# Patient Record
Sex: MALE | Race: WHITE | NOT HISPANIC OR LATINO
[De-identification: names, ages, dates, MRNs, and addresses within clinical notes are randomized per-mention and may not be internally consistent; named-entity substitution may affect disease eponyms.]

---

## 2019-10-22 ENCOUNTER — TRANSCRIPTION ENCOUNTER (OUTPATIENT)
Age: 34
End: 2019-10-22

## 2019-10-22 ENCOUNTER — INPATIENT (INPATIENT)
Facility: HOSPITAL | Age: 34
LOS: 0 days | Discharge: HOME | End: 2019-10-23
Attending: INTERNAL MEDICINE | Admitting: INTERNAL MEDICINE
Payer: COMMERCIAL

## 2019-10-22 VITALS
DIASTOLIC BLOOD PRESSURE: 82 MMHG | SYSTOLIC BLOOD PRESSURE: 119 MMHG | RESPIRATION RATE: 17 BRPM | HEART RATE: 92 BPM | OXYGEN SATURATION: 100 % | TEMPERATURE: 98 F

## 2019-10-22 DIAGNOSIS — M79.18 MYALGIA, OTHER SITE: ICD-10-CM

## 2019-10-22 DIAGNOSIS — Z98.890 OTHER SPECIFIED POSTPROCEDURAL STATES: Chronic | ICD-10-CM

## 2019-10-22 DIAGNOSIS — D72.829 ELEVATED WHITE BLOOD CELL COUNT, UNSPECIFIED: ICD-10-CM

## 2019-10-22 DIAGNOSIS — J45.909 UNSPECIFIED ASTHMA, UNCOMPLICATED: ICD-10-CM

## 2019-10-22 LAB
ALBUMIN SERPL ELPH-MCNC: 4.8 G/DL — SIGNIFICANT CHANGE UP (ref 3.3–5)
ALP SERPL-CCNC: 54 U/L — SIGNIFICANT CHANGE UP (ref 40–120)
ALT FLD-CCNC: 31 U/L — SIGNIFICANT CHANGE UP (ref 4–41)
ANION GAP SERPL CALC-SCNC: 12 MMO/L — SIGNIFICANT CHANGE UP (ref 7–14)
ANISOCYTOSIS BLD QL: SLIGHT — SIGNIFICANT CHANGE UP
APPEARANCE UR: CLEAR — SIGNIFICANT CHANGE UP
APTT BLD: 32 SEC — SIGNIFICANT CHANGE UP (ref 27.5–36.3)
AST SERPL-CCNC: 35 U/L — SIGNIFICANT CHANGE UP (ref 4–40)
BASOPHILS # BLD AUTO: 0.19 K/UL — SIGNIFICANT CHANGE UP (ref 0–0.2)
BASOPHILS # BLD AUTO: 0.22 K/UL — HIGH (ref 0–0.2)
BASOPHILS NFR BLD AUTO: 0.6 % — SIGNIFICANT CHANGE UP (ref 0–2)
BASOPHILS NFR BLD AUTO: 0.8 % — SIGNIFICANT CHANGE UP (ref 0–2)
BASOPHILS NFR SPEC: 2.5 % — HIGH (ref 0–2)
BILIRUB SERPL-MCNC: 0.3 MG/DL — SIGNIFICANT CHANGE UP (ref 0.2–1.2)
BILIRUB UR-MCNC: NEGATIVE — SIGNIFICANT CHANGE UP
BLASTS # FLD: 0 % — SIGNIFICANT CHANGE UP (ref 0–0)
BLD GP AB SCN SERPL QL: NEGATIVE — SIGNIFICANT CHANGE UP
BLOOD UR QL VISUAL: NEGATIVE — SIGNIFICANT CHANGE UP
BUN SERPL-MCNC: 11 MG/DL — SIGNIFICANT CHANGE UP (ref 7–23)
CALCIUM SERPL-MCNC: 9.7 MG/DL — SIGNIFICANT CHANGE UP (ref 8.4–10.5)
CHLORIDE SERPL-SCNC: 101 MMOL/L — SIGNIFICANT CHANGE UP (ref 98–107)
CO2 SERPL-SCNC: 24 MMOL/L — SIGNIFICANT CHANGE UP (ref 22–31)
COLOR SPEC: SIGNIFICANT CHANGE UP
CREAT SERPL-MCNC: 0.9 MG/DL — SIGNIFICANT CHANGE UP (ref 0.5–1.3)
EOSINOPHIL # BLD AUTO: 0.36 K/UL — SIGNIFICANT CHANGE UP (ref 0–0.5)
EOSINOPHIL # BLD AUTO: 0.41 K/UL — SIGNIFICANT CHANGE UP (ref 0–0.5)
EOSINOPHIL NFR BLD AUTO: 1.4 % — SIGNIFICANT CHANGE UP (ref 0–6)
EOSINOPHIL NFR BLD AUTO: 1.4 % — SIGNIFICANT CHANGE UP (ref 0–6)
EOSINOPHIL NFR FLD: 3.3 % — SIGNIFICANT CHANGE UP (ref 0–6)
GLUCOSE SERPL-MCNC: 109 MG/DL — HIGH (ref 70–99)
GLUCOSE UR-MCNC: NEGATIVE — SIGNIFICANT CHANGE UP
HCT VFR BLD CALC: 46.4 % — SIGNIFICANT CHANGE UP (ref 39–50)
HCT VFR BLD CALC: 47.5 % — SIGNIFICANT CHANGE UP (ref 39–50)
HGB BLD-MCNC: 14.8 G/DL — SIGNIFICANT CHANGE UP (ref 13–17)
HGB BLD-MCNC: 14.9 G/DL — SIGNIFICANT CHANGE UP (ref 13–17)
IMM GRANULOCYTES NFR BLD AUTO: 23.5 % — HIGH (ref 0–1.5)
IMM GRANULOCYTES NFR BLD AUTO: 24.7 % — HIGH (ref 0–1.5)
INR BLD: 1.04 — SIGNIFICANT CHANGE UP (ref 0.88–1.17)
KETONES UR-MCNC: NEGATIVE — SIGNIFICANT CHANGE UP
LEUKOCYTE ESTERASE UR-ACNC: NEGATIVE — SIGNIFICANT CHANGE UP
LYMPHOCYTES # BLD AUTO: 11.3 % — LOW (ref 13–44)
LYMPHOCYTES # BLD AUTO: 13.4 % — SIGNIFICANT CHANGE UP (ref 13–44)
LYMPHOCYTES # BLD AUTO: 3 K/UL — SIGNIFICANT CHANGE UP (ref 1–3.3)
LYMPHOCYTES # BLD AUTO: 4.05 K/UL — HIGH (ref 1–3.3)
LYMPHOCYTES NFR SPEC AUTO: 11.8 % — LOW (ref 13–44)
MANUAL SMEAR VERIFICATION: SIGNIFICANT CHANGE UP
MCHC RBC-ENTMCNC: 26.1 PG — LOW (ref 27–34)
MCHC RBC-ENTMCNC: 26.4 PG — LOW (ref 27–34)
MCHC RBC-ENTMCNC: 31.4 % — LOW (ref 32–36)
MCHC RBC-ENTMCNC: 31.9 % — LOW (ref 32–36)
MCV RBC AUTO: 82.7 FL — SIGNIFICANT CHANGE UP (ref 80–100)
MCV RBC AUTO: 83.2 FL — SIGNIFICANT CHANGE UP (ref 80–100)
METAMYELOCYTES # FLD: 8.4 % — HIGH (ref 0–1)
MONOCYTES # BLD AUTO: 1.06 K/UL — HIGH (ref 0–0.9)
MONOCYTES # BLD AUTO: 1.17 K/UL — HIGH (ref 0–0.9)
MONOCYTES NFR BLD AUTO: 3.9 % — SIGNIFICANT CHANGE UP (ref 2–14)
MONOCYTES NFR BLD AUTO: 4 % — SIGNIFICANT CHANGE UP (ref 2–14)
MONOCYTES NFR BLD: 5 % — SIGNIFICANT CHANGE UP (ref 2–9)
MYELOCYTES NFR BLD: 3.4 % — HIGH (ref 0–0)
NEUTROPHIL AB SER-ACNC: 58.8 % — SIGNIFICANT CHANGE UP (ref 43–77)
NEUTROPHILS # BLD AUTO: 15.73 K/UL — HIGH (ref 1.8–7.4)
NEUTROPHILS # BLD AUTO: 17.01 K/UL — HIGH (ref 1.8–7.4)
NEUTROPHILS NFR BLD AUTO: 56 % — SIGNIFICANT CHANGE UP (ref 43–77)
NEUTROPHILS NFR BLD AUTO: 59 % — SIGNIFICANT CHANGE UP (ref 43–77)
NEUTS BAND # BLD: 3.4 % — SIGNIFICANT CHANGE UP (ref 0–6)
NITRITE UR-MCNC: NEGATIVE — SIGNIFICANT CHANGE UP
NRBC # FLD: 0 K/UL — SIGNIFICANT CHANGE UP (ref 0–0)
NRBC # FLD: 0 K/UL — SIGNIFICANT CHANGE UP (ref 0–0)
OTHER - HEMATOLOGY %: 0 — SIGNIFICANT CHANGE UP
PH UR: 7.5 — SIGNIFICANT CHANGE UP (ref 5–8)
PLATELET # BLD AUTO: 259 K/UL — SIGNIFICANT CHANGE UP (ref 150–400)
PLATELET # BLD AUTO: 270 K/UL — SIGNIFICANT CHANGE UP (ref 150–400)
PLATELET COUNT - ESTIMATE: NORMAL — SIGNIFICANT CHANGE UP
PMV BLD: 10.1 FL — SIGNIFICANT CHANGE UP (ref 7–13)
PMV BLD: 10.5 FL — SIGNIFICANT CHANGE UP (ref 7–13)
POTASSIUM SERPL-MCNC: 4.2 MMOL/L — SIGNIFICANT CHANGE UP (ref 3.5–5.3)
POTASSIUM SERPL-SCNC: 4.2 MMOL/L — SIGNIFICANT CHANGE UP (ref 3.5–5.3)
PROMYELOCYTES # FLD: 0 % — SIGNIFICANT CHANGE UP (ref 0–0)
PROT SERPL-MCNC: 7.9 G/DL — SIGNIFICANT CHANGE UP (ref 6–8.3)
PROT UR-MCNC: NEGATIVE — SIGNIFICANT CHANGE UP
PROTHROM AB SERPL-ACNC: 11.9 SEC — SIGNIFICANT CHANGE UP (ref 9.8–13.1)
RBC # BLD: 5.61 M/UL — SIGNIFICANT CHANGE UP (ref 4.2–5.8)
RBC # BLD: 5.71 M/UL — SIGNIFICANT CHANGE UP (ref 4.2–5.8)
RBC # FLD: 15 % — HIGH (ref 10.3–14.5)
RBC # FLD: 15.1 % — HIGH (ref 10.3–14.5)
REVIEW TO FOLLOW: YES — SIGNIFICANT CHANGE UP
RH IG SCN BLD-IMP: POSITIVE — SIGNIFICANT CHANGE UP
SODIUM SERPL-SCNC: 137 MMOL/L — SIGNIFICANT CHANGE UP (ref 135–145)
SP GR SPEC: 1.01 — SIGNIFICANT CHANGE UP (ref 1–1.04)
UROBILINOGEN FLD QL: NORMAL — SIGNIFICANT CHANGE UP
VARIANT LYMPHS # BLD: 3.4 % — SIGNIFICANT CHANGE UP
WBC # BLD: 26.64 K/UL — HIGH (ref 3.8–10.5)
WBC # BLD: 30.3 K/UL — HIGH (ref 3.8–10.5)
WBC # FLD AUTO: 26.64 K/UL — HIGH (ref 3.8–10.5)
WBC # FLD AUTO: 30.3 K/UL — HIGH (ref 3.8–10.5)

## 2019-10-22 PROCEDURE — 74177 CT ABD & PELVIS W/CONTRAST: CPT | Mod: 26

## 2019-10-22 PROCEDURE — 76870 US EXAM SCROTUM: CPT | Mod: 26

## 2019-10-22 RX ORDER — KETOROLAC TROMETHAMINE 30 MG/ML
15 SYRINGE (ML) INJECTION ONCE
Refills: 0 | Status: DISCONTINUED | OUTPATIENT
Start: 2019-10-22 | End: 2019-10-22

## 2019-10-22 RX ORDER — ONDANSETRON 8 MG/1
4 TABLET, FILM COATED ORAL ONCE
Refills: 0 | Status: COMPLETED | OUTPATIENT
Start: 2019-10-22 | End: 2019-10-22

## 2019-10-22 RX ORDER — KETOROLAC TROMETHAMINE 30 MG/ML
30 SYRINGE (ML) INJECTION EVERY 8 HOURS
Refills: 0 | Status: DISCONTINUED | OUTPATIENT
Start: 2019-10-22 | End: 2019-10-23

## 2019-10-22 RX ORDER — MORPHINE SULFATE 50 MG/1
2 CAPSULE, EXTENDED RELEASE ORAL EVERY 4 HOURS
Refills: 0 | Status: DISCONTINUED | OUTPATIENT
Start: 2019-10-22 | End: 2019-10-23

## 2019-10-22 RX ORDER — MORPHINE SULFATE 50 MG/1
4 CAPSULE, EXTENDED RELEASE ORAL ONCE
Refills: 0 | Status: DISCONTINUED | OUTPATIENT
Start: 2019-10-22 | End: 2019-10-22

## 2019-10-22 RX ORDER — SODIUM CHLORIDE 9 MG/ML
1000 INJECTION INTRAMUSCULAR; INTRAVENOUS; SUBCUTANEOUS ONCE
Refills: 0 | Status: COMPLETED | OUTPATIENT
Start: 2019-10-22 | End: 2019-10-22

## 2019-10-22 RX ORDER — ACETAMINOPHEN 500 MG
650 TABLET ORAL ONCE
Refills: 0 | Status: COMPLETED | OUTPATIENT
Start: 2019-10-22 | End: 2019-10-22

## 2019-10-22 RX ADMIN — MORPHINE SULFATE 4 MILLIGRAM(S): 50 CAPSULE, EXTENDED RELEASE ORAL at 14:00

## 2019-10-22 RX ADMIN — MORPHINE SULFATE 2 MILLIGRAM(S): 50 CAPSULE, EXTENDED RELEASE ORAL at 23:52

## 2019-10-22 RX ADMIN — MORPHINE SULFATE 4 MILLIGRAM(S): 50 CAPSULE, EXTENDED RELEASE ORAL at 11:03

## 2019-10-22 RX ADMIN — Medication 15 MILLIGRAM(S): at 16:15

## 2019-10-22 RX ADMIN — SODIUM CHLORIDE 1000 MILLILITER(S): 9 INJECTION INTRAMUSCULAR; INTRAVENOUS; SUBCUTANEOUS at 11:03

## 2019-10-22 RX ADMIN — MORPHINE SULFATE 2 MILLIGRAM(S): 50 CAPSULE, EXTENDED RELEASE ORAL at 23:36

## 2019-10-22 RX ADMIN — Medication 650 MILLIGRAM(S): at 20:20

## 2019-10-22 RX ADMIN — Medication 15 MILLIGRAM(S): at 19:45

## 2019-10-22 RX ADMIN — ONDANSETRON 4 MILLIGRAM(S): 8 TABLET, FILM COATED ORAL at 11:03

## 2019-10-22 RX ADMIN — Medication 15 MILLIGRAM(S): at 15:50

## 2019-10-22 RX ADMIN — MORPHINE SULFATE 4 MILLIGRAM(S): 50 CAPSULE, EXTENDED RELEASE ORAL at 11:33

## 2019-10-22 RX ADMIN — MORPHINE SULFATE 4 MILLIGRAM(S): 50 CAPSULE, EXTENDED RELEASE ORAL at 13:25

## 2019-10-22 NOTE — ED ADULT NURSE NOTE - OBJECTIVE STATEMENT
Intake RN: Patient is a 34 y/o M a&ox4 p/w a c/c of testicular pain that began several days ago.  Denies dysuria/hematuria, N/V/D, F/C.  Patient reports pain is localized to right testicle.  20 gauge PIV in right ac, medicated as per MD order.

## 2019-10-22 NOTE — H&P ADULT - NSHPREVIEWOFSYSTEMS_GEN_ALL_CORE
Gen: no loss of wt no loss of appetite  ENT: no dizziness no hearing loss  Ophth: no blurring of vision no loss of vision  Resp: No cough no sputum production  CVS: No chest pain no palpitations no orthopnea  GI: + nausea, no vomiting or diarrhea   : no dysuria, hematuria  Endo: no polyuria no excessive sweating  Neuro: no weakness no paresthesias  Psych: No suicidal no depressive ideation  Heme: No petechiae no easy bruising  Msk: No joint pain no swelling see above HPI   Skin: No rash no itching  All other ROS negative

## 2019-10-22 NOTE — ED PROVIDER NOTE - OBJECTIVE STATEMENT
Cabot: 33M with PMH of asthma, PSH of umbilical hernia, here with R groin pain since picking up an air conditioner yesterday.  + nausea.  No F/C/V, + flatus and BMs, no bulge at groin.  No urinary sx.

## 2019-10-22 NOTE — H&P ADULT - ASSESSMENT
33M with PMH of asthma, PSH of umbilical hernia, here with R groin pain since picking up an air conditioner yesterday. Noted to have leukocytosis admitted for pain control and work up of leukocytosis

## 2019-10-22 NOTE — H&P ADULT - PROBLEM SELECTOR PLAN 1
unclear etiology   CT abd is negative   no lymphadenopathy noted  DDx includes leukemia, lymphoma, other hematopoietic disorders  heme evaluation in AM

## 2019-10-22 NOTE — CHART NOTE - NSCHARTNOTEFT_GEN_A_CORE
Pt w/right sided groin pain, w/associated testicular discomfort since yesterday afternoon. Pain started after lifting heavy object. Since then, pt has had persistent pain, somewhat alleviated w/ibuprofen. Also alleviated when lying down. Denies dysuria.    PE  Gen: NAD  Abd: RLQ tenderness, discomfort upon palpation along inguinal canal  : circumcised phallus, testicles normal lie b/l, no edema, no overlying skin changes. R epididymal cyst palpated. R testicle mildly tender to palpation  no inguinal hernia palpated on valsalva    < from: US Testicles (10.22.19 @ 11:34) >    RIGHT:    Right testis: 5 x 3 x 4 cm. Normal echogenicity and echotexture with no   masses or areas of architectural distortion. Normal arterial and venous   blood flow pattern.    Right epididymis: Tiny cyst.    Right hydrocele: Trace.    Right varicocele: None.    Urinalysis (10.22.19 @ 11:00)    Color: LIGHT YELLOW    Urine Appearance: CLEAR    Glucose: NEGATIVE    Bilirubin: NEGATIVE    Ketone - Urine: NEGATIVE    Specific Gravity: 1.009    Blood: NEGATIVE    pH - Urine: 7.5    Protein, Urine: NEGATIVE    Urobilinogen: NORMAL    Nitrite: NEGATIVE    Leukocyte Esterase Concentration: NEGATIVE    LEFT:     Left testis: 4 x 2 x 3 cm. Normal echogenicity and echotexture with no   masses or areas of architectural distortion. Normal arterial and venous   blood flow pattern.    Left epididymis: Within normal limits.    Left hydrocele: Small.    Left varicocele: None.    IMPRESSION:     No evidence of testicular torsion.    < end of copied text >    33M w/right sided groin pain, w/associated testicular discomfort since yesterday afternoon.  -- US not consisted w/testicular torsion - nl arterial flow to R testicle  -- R testicle tender, however pain started after heavy lifting - muscle strain vs R epididymoorchitis   -- NSAIDs PRN, scrotal support and elevation  -- please call [phone] if further questions

## 2019-10-22 NOTE — H&P ADULT - NSHPPHYSICALEXAM_GEN_ALL_CORE
PHYSICAL EXAM: vital signs as above  in no apparent distress  HEENT: SUSANNA EOMI  Neck: Supple, no JVD, no thyromegaly  Lungs: no rhonchi, no wheeze, no crackles  CVS: S1 S2 no M/R/G  Abdomen: no tenderness, no organomegaly, BS present  + tenderness superficial over right lower abd   Neuro: AO x 3 no focal weakness, no sensory abnormalities  Psych: appropriate affect  Skin: warm, dry  Ext: no cyanosis or clubbing, no edema  Msk: no joint swelling or deformities  Back: no CVA tenderness, no kyphosis/scoliosis

## 2019-10-22 NOTE — ED PROVIDER NOTE - PROGRESS NOTE DETAILS
Cabot: Concern for testicular torsion.  Urology aware and coming down to eval after testicular US, which patient will be wheeled up to now. Cabot: No torsion on US.  Will check CT A/P for hernia, stone, other pathology. Lea: sign out received from Dr. Cabot. awaiting Uro recs. Uro coming to see. elevated WBC count noted. spoke with pts PMD, Dr. Hayes who reports elevation in the past of 19K, referred to a Hematologist but didn't follow up. Spoke with patient regarding elevation and possible causes including hematologic malignancy, recommending admission for heme/onc eval. patient comfortable with plan.

## 2019-10-22 NOTE — ED PROVIDER NOTE - CLINICAL SUMMARY MEDICAL DECISION MAKING FREE TEXT BOX
Cabot: 33M with PMH of asthma, PSH of umbilical hernia, here with R groin pain since picking up an air conditioner yesterday.  + nausea.  No F/C/V, + flatus and BMs, no bulge at groin.  No urinary sx.  High riding testicle, tender to palpation, no cremasteric reflex, no hernia noted.  Concern for testicular torsion, less likely hernia or stone.  Will check preop labs, UA, testic US.  Uro already aware and coming to eval.

## 2019-10-22 NOTE — H&P ADULT - HISTORY OF PRESENT ILLNESS
33M with PMH of asthma, PSH of umbilical hernia, here with R groin pain since picking up an air conditioner yesterday + nausea. He needed multiple doses of pain meds in the Emergency Department and is still in considerable pain. States pain is radiating onto right groin and it hurts him to move torso right to left rotational movement

## 2019-10-22 NOTE — ED PROVIDER NOTE - PHYSICAL EXAMINATION
HDS, NAD, MMM, eyes clear, lungs CTAB, heart sounds normal, abd soft, NT, ND, no CVAT, no R groin hernia appreciated, R testicle riding 2cm higher than the left, missing R sided cremasteric reflex, R testicle tender to palpation, LEs without edema, wwp, skin normal temperature and color, neuro: alert and oriented, no focal deficits

## 2019-10-23 ENCOUNTER — TRANSCRIPTION ENCOUNTER (OUTPATIENT)
Age: 34
End: 2019-10-23

## 2019-10-23 VITALS — HEIGHT: 72 IN | WEIGHT: 202.83 LBS

## 2019-10-23 LAB
ANION GAP SERPL CALC-SCNC: 14 MMO/L — SIGNIFICANT CHANGE UP (ref 7–14)
BASOPHILS # BLD AUTO: 0.13 K/UL — SIGNIFICANT CHANGE UP (ref 0–0.2)
BASOPHILS NFR BLD AUTO: 0.6 % — SIGNIFICANT CHANGE UP (ref 0–2)
BUN SERPL-MCNC: 17 MG/DL — SIGNIFICANT CHANGE UP (ref 7–23)
CALCIUM SERPL-MCNC: 8.8 MG/DL — SIGNIFICANT CHANGE UP (ref 8.4–10.5)
CHLORIDE SERPL-SCNC: 101 MMOL/L — SIGNIFICANT CHANGE UP (ref 98–107)
CO2 SERPL-SCNC: 22 MMOL/L — SIGNIFICANT CHANGE UP (ref 22–31)
CREAT SERPL-MCNC: 0.99 MG/DL — SIGNIFICANT CHANGE UP (ref 0.5–1.3)
EOSINOPHIL # BLD AUTO: 0.38 K/UL — SIGNIFICANT CHANGE UP (ref 0–0.5)
EOSINOPHIL NFR BLD AUTO: 1.7 % — SIGNIFICANT CHANGE UP (ref 0–6)
GLUCOSE SERPL-MCNC: 78 MG/DL — SIGNIFICANT CHANGE UP (ref 70–99)
HCT VFR BLD CALC: 45.7 % — SIGNIFICANT CHANGE UP (ref 39–50)
HGB BLD-MCNC: 13.7 G/DL — SIGNIFICANT CHANGE UP (ref 13–17)
IMM GRANULOCYTES NFR BLD AUTO: 21.7 % — HIGH (ref 0–1.5)
INR BLD: 1.06 — SIGNIFICANT CHANGE UP (ref 0.88–1.17)
LYMPHOCYTES # BLD AUTO: 15.7 % — SIGNIFICANT CHANGE UP (ref 13–44)
LYMPHOCYTES # BLD AUTO: 3.54 K/UL — HIGH (ref 1–3.3)
MANUAL SMEAR VERIFICATION: SIGNIFICANT CHANGE UP
MCHC RBC-ENTMCNC: 25.8 PG — LOW (ref 27–34)
MCHC RBC-ENTMCNC: 30 % — LOW (ref 32–36)
MCV RBC AUTO: 85.9 FL — SIGNIFICANT CHANGE UP (ref 80–100)
MONOCYTES # BLD AUTO: 0.88 K/UL — SIGNIFICANT CHANGE UP (ref 0–0.9)
MONOCYTES NFR BLD AUTO: 3.9 % — SIGNIFICANT CHANGE UP (ref 2–14)
NEUTROPHILS # BLD AUTO: 12.75 K/UL — HIGH (ref 1.8–7.4)
NEUTROPHILS NFR BLD AUTO: 56.4 % — SIGNIFICANT CHANGE UP (ref 43–77)
NRBC # FLD: 0 K/UL — SIGNIFICANT CHANGE UP (ref 0–0)
PLATELET # BLD AUTO: 241 K/UL — SIGNIFICANT CHANGE UP (ref 150–400)
PMV BLD: 11 FL — SIGNIFICANT CHANGE UP (ref 7–13)
POTASSIUM SERPL-MCNC: 4.1 MMOL/L — SIGNIFICANT CHANGE UP (ref 3.5–5.3)
POTASSIUM SERPL-SCNC: 4.1 MMOL/L — SIGNIFICANT CHANGE UP (ref 3.5–5.3)
PROTHROM AB SERPL-ACNC: 11.8 SEC — SIGNIFICANT CHANGE UP (ref 9.8–13.1)
RBC # BLD: 5.32 M/UL — SIGNIFICANT CHANGE UP (ref 4.2–5.8)
RBC # FLD: 15.1 % — HIGH (ref 10.3–14.5)
SODIUM SERPL-SCNC: 137 MMOL/L — SIGNIFICANT CHANGE UP (ref 135–145)
SPECIMEN SOURCE: SIGNIFICANT CHANGE UP
TSH SERPL-MCNC: 2.48 UIU/ML — SIGNIFICANT CHANGE UP (ref 0.27–4.2)
WBC # BLD: 22.57 K/UL — HIGH (ref 3.8–10.5)
WBC # FLD AUTO: 22.57 K/UL — HIGH (ref 3.8–10.5)

## 2019-10-23 RX ORDER — ACETAMINOPHEN 500 MG
650 TABLET ORAL ONCE
Refills: 0 | Status: COMPLETED | OUTPATIENT
Start: 2019-10-23 | End: 2019-10-23

## 2019-10-23 RX ORDER — PANTOPRAZOLE SODIUM 20 MG/1
1 TABLET, DELAYED RELEASE ORAL
Qty: 7 | Refills: 0
Start: 2019-10-23 | End: 2019-10-29

## 2019-10-23 RX ORDER — DOCUSATE SODIUM 100 MG
1 CAPSULE ORAL
Qty: 14 | Refills: 0
Start: 2019-10-23 | End: 2019-10-29

## 2019-10-23 RX ORDER — ONDANSETRON 8 MG/1
4 TABLET, FILM COATED ORAL ONCE
Refills: 0 | Status: COMPLETED | OUTPATIENT
Start: 2019-10-23 | End: 2019-10-23

## 2019-10-23 RX ADMIN — MORPHINE SULFATE 2 MILLIGRAM(S): 50 CAPSULE, EXTENDED RELEASE ORAL at 08:50

## 2019-10-23 RX ADMIN — Medication 30 MILLIGRAM(S): at 05:30

## 2019-10-23 RX ADMIN — ONDANSETRON 4 MILLIGRAM(S): 8 TABLET, FILM COATED ORAL at 05:31

## 2019-10-23 RX ADMIN — Medication 30 MILLIGRAM(S): at 05:14

## 2019-10-23 RX ADMIN — MORPHINE SULFATE 2 MILLIGRAM(S): 50 CAPSULE, EXTENDED RELEASE ORAL at 08:39

## 2019-10-23 RX ADMIN — Medication 30 MILLIGRAM(S): at 14:00

## 2019-10-23 RX ADMIN — Medication 650 MILLIGRAM(S): at 12:01

## 2019-10-23 RX ADMIN — Medication 650 MILLIGRAM(S): at 12:45

## 2019-10-23 RX ADMIN — Medication 30 MILLIGRAM(S): at 13:52

## 2019-10-23 NOTE — DISCHARGE NOTE PROVIDER - HOSPITAL COURSE
33M with PMH of asthma, PSH of umbilical hernia, here with R groin pain since picking up an air conditioner yesterday. Noted to have leukocytosis admitted for pain control and work up of leukocytosis          Leukocytosis unclear etiology     - CT A/P- No acute pathology, no lymphadenopathy noted    - Amirah Rivera consulted- chronic leukocytosis with nl diff in asthmatic who is on chronic inhaled steroids twice daily    - obtained PCR for BCR/ABL r/o CML        Musculoskeletal pain - likely strain injury secondary to heavy lifting    - continue pain meds, Naproxen 500 po BID x 5 days, Protonix 40 po qd x 7 days, Percocet PRN  5/325 2 tabs q 4 PRN, Colace 100 BID PRN        Asthma- no wheeze, continue with Proventil inhaler PRN        Dispo home with outpt follow up with PCP and Amirah

## 2019-10-23 NOTE — CONSULT NOTE ADULT - ASSESSMENT
chronic leukocytosis with nl diff in asthmatic on chronic inhaled steroids twice daily  likely from demargination  obtain PCR for bcr/abl r/o cml, doubt  Pt instructed to call me for result in one week as likely to be discharged.

## 2019-10-23 NOTE — DISCHARGE NOTE NURSING/CASE MANAGEMENT/SOCIAL WORK - PATIENT PORTAL LINK FT
You can access the FollowMyHealth Patient Portal offered by [facility] by registering at the following website: [email or web address]. By joining [facility]’s FollowMyHealth portal, you will also be able to view your health information using other applications (apps) compatible with our system.

## 2019-10-23 NOTE — DISCHARGE NOTE PROVIDER - CARE PROVIDER_API CALL
Wm Rivera (MD)  Hematology; Medical Oncology  [address], [address]  [place] [address]  Phone: [phone]  Fax: [phone]  Follow Up Time:

## 2019-10-23 NOTE — PROGRESS NOTE ADULT - SUBJECTIVE AND OBJECTIVE BOX
Patient is a 33y old  Male who presents with a chief complaint of right abdominal and groin pain (23 Oct 2019 09:37)      SUBJECTIVE / OVERNIGHT EVENTS: overnight events noted    ROS:  Resp: No cough no sputum production  CVS: No chest pain no palpitations no orthopnea  GI: no N/V/D  right groin and abd pain better  : no dysuria, no hematuria  Neuro: no weakness no paresthesias  Heme: No petechiae no easy bruising  Msk: No joint pain no swelling  Skin: No rash no itching        MEDICATIONS  (STANDING):  ketorolac   Injectable 30 milliGRAM(s) IV Push every 8 hours    MEDICATIONS  (PRN):  morphine  - Injectable 2 milliGRAM(s) IV Push every 4 hours PRN Moderate Pain (4 - 6)        CAPILLARY BLOOD GLUCOSE        I&O's Summary      Vital Signs Last 24 Hrs  T(C): 36.6 (23 Oct 2019 05:13), Max: 36.7 (22 Oct 2019 13:49)  T(F): 97.8 (23 Oct 2019 05:13), Max: 98.1 (22 Oct 2019 13:49)  HR: 63 (23 Oct 2019 08:40) (50 - 63)  BP: 111/65 (23 Oct 2019 08:40) (104/64 - 121/79)  BP(mean): --  RR: 16 (23 Oct 2019 08:40) (15 - 18)  SpO2: 100% (23 Oct 2019 08:40) (97% - 100%)    PHYSICAL EXAM: vital signs as above  in no apparent distress  HEENT: SUSANNA EOMI  Neck: Supple, no JVD, no thyromegaly  Lungs: no rhonchi, no wheeze, no crackles  CVS: S1 S2 no M/R/G  Abdomen: no tenderness, no organomegaly, BS present  less tenderness superficial over right lower abd   Neuro: AO x 3 no focal weakness, no sensory abnormalities  Psych: appropriate affect  Skin: warm, dry  Ext: no cyanosis or clubbing, no edema  Msk: no joint swelling or deformities  Back: no CVA tenderness, no kyphosis/scoliosis    LABS:                        13.7   22.57 )-----------( 241      ( 23 Oct 2019 05:10 )             45.7     10    137  |  101  |  17  ----------------------------<  78  4.1   |  22  |  0.99    Ca    8.8      23 Oct 2019 05:10    TPro  7.9  /  Alb  4.8  /  TBili  0.3  /  DBili  x   /  AST  35  /  ALT  31  /  AlkPhos  54  10-22    PT/INR - ( 23 Oct 2019 05:10 )   PT: 11.8 SEC;   INR: 1.06          PTT - ( 22 Oct 2019 11:00 )  PTT:32.0 SEC      Urinalysis Basic - ( 22 Oct 2019 11:00 )    Color: LIGHT YELLOW / Appearance: CLEAR / S.009 / pH: 7.5  Gluc: NEGATIVE / Ketone: NEGATIVE  / Bili: NEGATIVE / Urobili: NORMAL   Blood: NEGATIVE / Protein: NEGATIVE / Nitrite: NEGATIVE   Leuk Esterase: NEGATIVE / RBC: x / WBC x   Sq Epi: x / Non Sq Epi: x / Bacteria: x          All consultant(s) notes reviewed and care discussed with other providers        Contact Number, Dr Delaney [phone]

## 2019-10-23 NOTE — CONSULT NOTE ADULT - SUBJECTIVE AND OBJECTIVE BOX
hpi  33 year old man told of leukocytosis by PMD few months ago is admitted after groin pull   ct and ultrasound no evidence of testicle torsion or mass but required morphine  again found leukocytosis  denies oral steroids but takes advair  denies fever, infections  asthma takes multiple inhalers  pmh asthma  psh none  sh no tobacco etoh  fh noncontributory  comp ros no fever, testicular pain can walk after pain meds    meds advair, albuterol  physical  comfortable  vs t97.8 62 104/64 15 98 sat  lungs clear  cor s1s2  abd soft nontender  no hsm  ext no edema  psych nl  skin warm dry    data  wbc 40988 hgb 13.7 hct 45.7 plt [id number] 56 P 16 L 4 M 2 Eo   inr 1.06 ptt 32 cr 0.99 LFTs nl  ct abd/pelvis no mass

## 2019-10-23 NOTE — PROGRESS NOTE ADULT - PROBLEM SELECTOR PLAN 2
improved  will discharge home on Naproxen 500 po BID x 5 days  Protonix 40 po qd x 7 days  Percocet PRN  5/325 2 tabs q 4 PRN   Colace 100 BID PRN

## 2019-10-24 LAB — BACTERIA UR CULT: SIGNIFICANT CHANGE UP

## 2019-10-31 LAB
MISCELLANEOUS - CHEM: SIGNIFICANT CHANGE UP
MISCELLANEOUS - CHEM: SIGNIFICANT CHANGE UP

## 2020-06-17 PROBLEM — J45.909 UNSPECIFIED ASTHMA, UNCOMPLICATED: Chronic | Status: ACTIVE | Noted: 2019-10-22

## 2020-06-30 ENCOUNTER — TRANSCRIPTION ENCOUNTER (OUTPATIENT)
Age: 35
End: 2020-06-30

## 2020-07-02 ENCOUNTER — APPOINTMENT (OUTPATIENT)
Dept: UROLOGY | Facility: CLINIC | Age: 35
End: 2020-07-02
Payer: COMMERCIAL

## 2020-07-02 VITALS
TEMPERATURE: 98 F | OXYGEN SATURATION: 96 % | HEIGHT: 72 IN | HEART RATE: 78 BPM | RESPIRATION RATE: 16 BRPM | BODY MASS INDEX: 26.55 KG/M2 | DIASTOLIC BLOOD PRESSURE: 79 MMHG | SYSTOLIC BLOOD PRESSURE: 120 MMHG | WEIGHT: 196 LBS

## 2020-07-02 DIAGNOSIS — Z87.09 PERSONAL HISTORY OF OTHER DISEASES OF THE RESPIRATORY SYSTEM: ICD-10-CM

## 2020-07-02 DIAGNOSIS — Z78.9 OTHER SPECIFIED HEALTH STATUS: ICD-10-CM

## 2020-07-02 DIAGNOSIS — Z87.891 PERSONAL HISTORY OF NICOTINE DEPENDENCE: ICD-10-CM

## 2020-07-02 DIAGNOSIS — Z85.6 PERSONAL HISTORY OF LEUKEMIA: ICD-10-CM

## 2020-07-02 PROCEDURE — 99203 OFFICE O/P NEW LOW 30 MIN: CPT

## 2020-07-02 NOTE — HISTORY OF PRESENT ILLNESS
[FreeTextEntry1] : 34 year old male diagnosed as having CML\par \par found to have CML  on routine\par sprycel (dasantinib)\par seen by Dr Blanco\par Bone marrow biopsy + CML

## 2020-07-02 NOTE — PHYSICAL EXAM
[Edema] : no peripheral edema [Exaggerated Use Of Accessory Muscles For Inspiration] : no accessory muscle use [Abdomen Tenderness] : non-tender [Abdomen Soft] : soft [Bowel Sounds] : normal bowel sounds [Abdomen Hernia] : no hernia was discovered [] : no hepato-splenomegaly [Abdomen Mass (___ Cm)] : no abdominal mass palpated [Urethral Meatus] : meatus normal [Epididymis] : the epididymides were normal [Penis Abnormality] : normal circumcised penis [Skin Color & Pigmentation] : normal skin color and pigmentation [Testes Tenderness] : no tenderness of the testes [No Focal Deficits] : no focal deficits [Inguinal Lymph Nodes Enlarged Bilaterally] : inguinal [Oriented To Time, Place, And Person] : oriented to person, place, and time [FreeTextEntry1] : abdirahman

## 2020-07-02 NOTE — ASSESSMENT
[FreeTextEntry1] : 34 year old  with CML needing  SPRYCEL (tyrosine kinase inhibitor) \par Patient has 2 chldren (5 and 1)\par Wife has IUD that is being removed.\par Discussed cryopreservation \par [facility] of sperm\par Base line blood studies re endocrine impact of SPRYCEL\par Patient advised to utilize contraception after the institution of SPRYCEL; although literature exists indicating that "it does not appear to have a negative impact on the fetus"  Cierra et al Leukemia and Lymphoma March 2014)\par Wife will remove IUD and attempt to conceive before institution of Sprycel\par We discussed need for monitoring of hormone profile on Dasatinib.\par The JITENDRA DUONG  expressed fully understanding of the information provided, the consequences and the management.\par He will proceed with crypreservation of sperm\par Baseline endocrine profle obtained\par followup after submitting semen analysis for cryopreservation

## 2020-07-03 ENCOUNTER — TRANSCRIPTION ENCOUNTER (OUTPATIENT)
Age: 35
End: 2020-07-03

## 2020-07-03 DIAGNOSIS — N46.9 MALE INFERTILITY, UNSPECIFIED: ICD-10-CM

## 2020-07-03 LAB
ESTRADIOL SERPL-MCNC: 16 PG/ML
FSH SERPL-MCNC: 3.4 IU/L
LH SERPL-ACNC: 5.1 IU/L
PROLACTIN SERPL-MCNC: 5.6 NG/ML
TESTOST SERPL-MCNC: 287 NG/DL
TSH SERPL-ACNC: 0.98 UIU/ML

## 2020-07-06 ENCOUNTER — TRANSCRIPTION ENCOUNTER (OUTPATIENT)
Age: 35
End: 2020-07-06

## 2020-07-06 ENCOUNTER — OUTPATIENT (OUTPATIENT)
Dept: OUTPATIENT SERVICES | Facility: HOSPITAL | Age: 35
LOS: 1 days | End: 2020-07-06
Payer: COMMERCIAL

## 2020-07-06 ENCOUNTER — APPOINTMENT (OUTPATIENT)
Dept: UROLOGY | Facility: CLINIC | Age: 35
End: 2020-07-06

## 2020-07-06 ENCOUNTER — APPOINTMENT (OUTPATIENT)
Dept: RADIOLOGY | Facility: IMAGING CENTER | Age: 35
End: 2020-07-06
Payer: COMMERCIAL

## 2020-07-06 DIAGNOSIS — Z98.890 OTHER SPECIFIED POSTPROCEDURAL STATES: Chronic | ICD-10-CM

## 2020-07-06 DIAGNOSIS — Z00.8 ENCOUNTER FOR OTHER GENERAL EXAMINATION: ICD-10-CM

## 2020-07-06 PROCEDURE — 71046 X-RAY EXAM CHEST 2 VIEWS: CPT | Mod: 26

## 2020-07-06 PROCEDURE — 71046 X-RAY EXAM CHEST 2 VIEWS: CPT

## 2021-10-12 ENCOUNTER — NON-APPOINTMENT (OUTPATIENT)
Age: 36
End: 2021-10-12

## 2021-10-12 ENCOUNTER — APPOINTMENT (OUTPATIENT)
Dept: CARDIOLOGY | Facility: CLINIC | Age: 36
End: 2021-10-12
Payer: COMMERCIAL

## 2021-10-12 VITALS — SYSTOLIC BLOOD PRESSURE: 112 MMHG | DIASTOLIC BLOOD PRESSURE: 76 MMHG

## 2021-10-12 VITALS
DIASTOLIC BLOOD PRESSURE: 78 MMHG | BODY MASS INDEX: 26.55 KG/M2 | HEIGHT: 72 IN | HEART RATE: 65 BPM | WEIGHT: 196 LBS | OXYGEN SATURATION: 99 % | SYSTOLIC BLOOD PRESSURE: 120 MMHG

## 2021-10-12 DIAGNOSIS — Z82.49 FAMILY HISTORY OF ISCHEMIC HEART DISEASE AND OTHER DISEASES OF THE CIRCULATORY SYSTEM: ICD-10-CM

## 2021-10-12 DIAGNOSIS — Z84.1 FAMILY HISTORY OF DISORDERS OF KIDNEY AND URETER: ICD-10-CM

## 2021-10-12 DIAGNOSIS — Z51.81 ENCOUNTER FOR THERAPEUTIC DRUG LVL MONITORING: ICD-10-CM

## 2021-10-12 DIAGNOSIS — Z83.49 FAMILY HISTORY OF OTHER ENDOCRINE, NUTRITIONAL AND METABOLIC DISEASES: ICD-10-CM

## 2021-10-12 DIAGNOSIS — I51.7 CARDIOMEGALY: ICD-10-CM

## 2021-10-12 DIAGNOSIS — Z79.899 ENCOUNTER FOR THERAPEUTIC DRUG LVL MONITORING: ICD-10-CM

## 2021-10-12 PROCEDURE — 99214 OFFICE O/P EST MOD 30 MIN: CPT

## 2021-10-12 PROCEDURE — 93000 ELECTROCARDIOGRAM COMPLETE: CPT

## 2021-10-12 RX ORDER — DASATINIB 100 MG/1
100 TABLET ORAL
Refills: 0 | Status: ACTIVE | COMMUNITY

## 2021-10-12 RX ORDER — ALBUTEROL 90 MCG
90 AEROSOL (GRAM) INHALATION
Refills: 0 | Status: ACTIVE | COMMUNITY

## 2021-10-12 NOTE — PHYSICAL EXAM
[Well Developed] : well developed [Well Nourished] : well nourished [No Acute Distress] : no acute distress [Normal Conjunctiva] : normal conjunctiva [Normal Venous Pressure] : normal venous pressure [No Carotid Bruit] : no carotid bruit [Normal S1, S2] : normal S1, S2 [No Rub] : no rub [No Gallop] : no gallop [Murmur] : murmur [Clear Lung Fields] : clear lung fields [Good Air Entry] : good air entry [No Respiratory Distress] : no respiratory distress  [Soft] : abdomen soft [Non Tender] : non-tender [No Masses/organomegaly] : no masses/organomegaly [Normal Bowel Sounds] : normal bowel sounds [Normal Gait] : normal gait [No Edema] : no edema [No Cyanosis] : no cyanosis [No Clubbing] : no clubbing [No Varicosities] : no varicosities [No Rash] : no rash [No Skin Lesions] : no skin lesions [Moves all extremities] : moves all extremities [No Focal Deficits] : no focal deficits [Normal Speech] : normal speech [Normal] : alert and oriented, normal memory [Alert and Oriented] : alert and oriented [Normal memory] : normal memory [de-identified] : II/VI holosystolic murmur, I/VI diast murmur at LLSB

## 2021-10-12 NOTE — HISTORY OF PRESENT ILLNESS
[FreeTextEntry1] : JITENDRA DUONG is a 35 year old male.  Referred back to me for routine follow up by his oncologist Dr Geovanni Blanco for monitoring of possible cardiac side effects of Sprycel.\par Abd pain recently, was referred to GI Dr Galindo by Dr Blanco.\par + intermittent wheezing.  He is due to see his pulmonologist.\par He has had no chest pain, palpitations, or dizziness.\par Echocardiogram report from  April reviewed - mildly dilated LV.\par

## 2021-10-12 NOTE — DISCUSSION/SUMMARY
[FreeTextEntry1] : Advised pulmonary follow up\par Referred to primary care.\par Follow up 6 mo w repeat echo\par

## 2022-01-05 ENCOUNTER — APPOINTMENT (OUTPATIENT)
Dept: INTERNAL MEDICINE | Facility: CLINIC | Age: 37
End: 2022-01-05
Payer: COMMERCIAL

## 2022-01-05 ENCOUNTER — NON-APPOINTMENT (OUTPATIENT)
Age: 37
End: 2022-01-05

## 2022-01-05 VITALS
HEIGHT: 72 IN | BODY MASS INDEX: 26.84 KG/M2 | HEART RATE: 65 BPM | SYSTOLIC BLOOD PRESSURE: 143 MMHG | WEIGHT: 198.2 LBS | OXYGEN SATURATION: 100 % | DIASTOLIC BLOOD PRESSURE: 77 MMHG

## 2022-01-05 DIAGNOSIS — C92.10 CHRONIC MYELOID LEUKEMIA, BCR/ABL-POSITIVE, NOT HAVING ACHIEVED REMISSION: ICD-10-CM

## 2022-01-05 DIAGNOSIS — R01.1 CARDIAC MURMUR, UNSPECIFIED: ICD-10-CM

## 2022-01-05 DIAGNOSIS — R51.9 HEADACHE, UNSPECIFIED: ICD-10-CM

## 2022-01-05 DIAGNOSIS — Z23 ENCOUNTER FOR IMMUNIZATION: ICD-10-CM

## 2022-01-05 DIAGNOSIS — Z82.49 FAMILY HISTORY OF ISCHEMIC HEART DISEASE AND OTHER DISEASES OF THE CIRCULATORY SYSTEM: ICD-10-CM

## 2022-01-05 DIAGNOSIS — Z00.00 ENCOUNTER FOR GENERAL ADULT MEDICAL EXAMINATION W/OUT ABNORMAL FINDINGS: ICD-10-CM

## 2022-01-05 PROCEDURE — 99385 PREV VISIT NEW AGE 18-39: CPT | Mod: 25

## 2022-01-05 PROCEDURE — 93000 ELECTROCARDIOGRAM COMPLETE: CPT

## 2022-01-05 PROCEDURE — 36415 COLL VENOUS BLD VENIPUNCTURE: CPT

## 2022-01-05 PROCEDURE — G0009: CPT

## 2022-01-05 PROCEDURE — 90732 PPSV23 VACC 2 YRS+ SUBQ/IM: CPT

## 2022-01-05 RX ORDER — FLUTICASONE PROPIONATE AND SALMETEROL 100; 50 UG/1; UG/1
100-50 POWDER RESPIRATORY (INHALATION)
Qty: 1 | Refills: 0 | Status: ACTIVE | COMMUNITY

## 2022-01-06 DIAGNOSIS — E55.9 VITAMIN D DEFICIENCY, UNSPECIFIED: ICD-10-CM

## 2022-01-06 LAB
25(OH)D3 SERPL-MCNC: 16.7 NG/ML
ALBUMIN SERPL ELPH-MCNC: 4.9 G/DL
ALP BLD-CCNC: 61 U/L
ALT SERPL-CCNC: 23 U/L
ANION GAP SERPL CALC-SCNC: 11 MMOL/L
APPEARANCE: CLEAR
AST SERPL-CCNC: 28 U/L
BACTERIA: NEGATIVE
BASOPHILS # BLD AUTO: 0.02 K/UL
BASOPHILS NFR BLD AUTO: 0.3 %
BILIRUB DIRECT SERPL-MCNC: 0.1 MG/DL
BILIRUB INDIRECT SERPL-MCNC: 0.2 MG/DL
BILIRUB SERPL-MCNC: 0.3 MG/DL
BILIRUBIN URINE: NEGATIVE
BLOOD URINE: NEGATIVE
BUN SERPL-MCNC: 8 MG/DL
CALCIUM SERPL-MCNC: 9.2 MG/DL
CHLORIDE SERPL-SCNC: 106 MMOL/L
CHOLEST SERPL-MCNC: 172 MG/DL
CO2 SERPL-SCNC: 26 MMOL/L
COLOR: NORMAL
CREAT SERPL-MCNC: 1.03 MG/DL
EOSINOPHIL # BLD AUTO: 0.13 K/UL
EOSINOPHIL NFR BLD AUTO: 1.6 %
ESTIMATED AVERAGE GLUCOSE: 114 MG/DL
FERRITIN SERPL-MCNC: 101 NG/ML
FOLATE SERPL-MCNC: 14.2 NG/ML
GLUCOSE QUALITATIVE U: NEGATIVE
GLUCOSE SERPL-MCNC: 100 MG/DL
HBA1C MFR BLD HPLC: 5.6 %
HCT VFR BLD CALC: 44.9 %
HCV AB SER QL: NONREACTIVE
HCV S/CO RATIO: 0.21 S/CO
HDLC SERPL-MCNC: 39 MG/DL
HGB BLD-MCNC: 14.3 G/DL
HIV1+2 AB SPEC QL IA.RAPID: NONREACTIVE
HYALINE CASTS: 0 /LPF
IMM GRANULOCYTES NFR BLD AUTO: 0.3 %
IRON SATN MFR SERPL: 26 %
IRON SERPL-MCNC: 88 UG/DL
KETONES URINE: NEGATIVE
LDLC SERPL CALC-MCNC: 107 MG/DL
LEUKOCYTE ESTERASE URINE: NEGATIVE
LYMPHOCYTES # BLD AUTO: 2.59 K/UL
LYMPHOCYTES NFR BLD AUTO: 32.9 %
MAN DIFF?: NORMAL
MCHC RBC-ENTMCNC: 29.2 PG
MCHC RBC-ENTMCNC: 31.8 GM/DL
MCV RBC AUTO: 91.8 FL
MICROSCOPIC-UA: NORMAL
MONOCYTES # BLD AUTO: 0.79 K/UL
MONOCYTES NFR BLD AUTO: 10 %
NEUTROPHILS # BLD AUTO: 4.33 K/UL
NEUTROPHILS NFR BLD AUTO: 54.9 %
NITRITE URINE: NEGATIVE
NONHDLC SERPL-MCNC: 133 MG/DL
PH URINE: 7.5
PLATELET # BLD AUTO: 150 K/UL
POTASSIUM SERPL-SCNC: 4.4 MMOL/L
PROT SERPL-MCNC: 7.3 G/DL
PROTEIN URINE: NEGATIVE
RBC # BLD: 4.89 M/UL
RBC # FLD: 14.4 %
RED BLOOD CELLS URINE: 1 /HPF
SODIUM SERPL-SCNC: 143 MMOL/L
SPECIFIC GRAVITY URINE: 1.01
SQUAMOUS EPITHELIAL CELLS: 0 /HPF
TIBC SERPL-MCNC: 341 UG/DL
TRIGL SERPL-MCNC: 126 MG/DL
TSH SERPL-ACNC: 1.39 UIU/ML
UIBC SERPL-MCNC: 252 UG/DL
UROBILINOGEN URINE: NORMAL
VIT B12 SERPL-MCNC: 385 PG/ML
WBC # FLD AUTO: 7.88 K/UL
WHITE BLOOD CELLS URINE: 1 /HPF

## 2022-01-06 RX ORDER — ERGOCALCIFEROL 1.25 MG/1
1.25 MG CAPSULE, LIQUID FILLED ORAL
Qty: 12 | Refills: 3 | Status: ACTIVE | COMMUNITY
Start: 2022-01-06 | End: 1900-01-01

## 2022-01-06 NOTE — ASSESSMENT
[FreeTextEntry1] : 36 year old male presents for initial annual exam.\par \par History of CML,   diagnosed 2 years ago. follows with hematologist-Dr. Blanco.  Has been on Sprycel since.  Does complain of abdominal bloating, cramping which last several days following taking medication.  Has follow-up appointment with gastroenterologist later this month.\par -We will give Pneumovax today\par -Advised to sign medical release to get old records faxed over to review\par -Follow-up with gastroenterology as directed, may benefit from antispasmatic, but would require full work-up\par \par Mild persistent  asthma. compliant with  Advair.  Followed by pulmonology, Dr. Real. mainly controlled,  but occasionally has to use nebulizer treatments depending on work environment.   No recent hospitalization  for exacerbation\par -Continue medications as directed\par -Follow-up with pulmonology\par \par Does endorse family history of brain aneurysm in father.  Patient states has been having intermittent headaches\par -MRA head r/o aneurysm \par \par Systolic murmur.  Mild MVP, AR seen on echo done in 4/2021.  Follows with cardiology-Dr. Hayes\par -monitor \par \par Annual \par -Advise to get yearly Flu shot -up to date \par -Advise Yearly Eye exam with Ophthalmologist\par -Advise Yearly Dental exam\par -Educated of the importance of Healthy diet, such as Mediterranean Diet and Exercise, such as walking >20 minutes a day and increasing gradually as tolerated\par -Educated on the importance of limiting alcohol use to less than 5 drinks per week and refraining from tobacco and drug use.\par \par -covid -up to date \par -pneumovax -given today\par \par \par \par \par \par \par

## 2022-01-06 NOTE — HEALTH RISK ASSESSMENT
[Good] : ~his/her~  mood as  good [Former] : Former [No] : In the past 12 months have you used drugs other than those required for medical reasons? No [No falls in past year] : Patient reported no falls in the past year [0] : 2) Feeling down, depressed, or hopeless: Not at all (0) [Employed] : employed [[id number]] : 0

## 2022-01-06 NOTE — HISTORY OF PRESENT ILLNESS
[FreeTextEntry1] : Cardio- Hayes \par Pulm-Kucher \par GI- Mateo \par Hem/onco- Blanco [de-identified] : 36 year old male presents for initial annual exam.\par \par History of CML,   diagnosed 2 years ago. follows with hematologist-Dr. Blanco.  Has been on Sprycel since.  Does complain of abdominal bloating, cramping which last several days following taking medication.  Has follow-up appointment with gastroenterologist later this month.\par \par History of asthma. compliant with  Advair.  Followed by pulmonology, Dr. Real. mainly controlled,  but occasionally has to use nebulizer treatments depending on work environment.   No recent hospitalization  for exacerbation\par \par Does endorse family history of brain aneurysm in father.  Patient states has been having intermittent headaches\par , 2 kids\par Employed-construction\par Non-tobacco smoker, uses marijuana occasionally for abdominal bloating, cramping\par \par \par \par \par \par \par \par \par \par \par

## 2022-01-06 NOTE — PHYSICAL EXAM
[Normal] : no respiratory distress, lungs were clear to auscultation bilaterally and no accessory muscle use [Normal Rate] : normal rate  [Regular Rhythm] : with a regular rhythm [Normal S1, S2] : normal S1 and S2 [No Varicosities] : no varicosities [Pedal Pulses Present] : the pedal pulses are present [No Edema] : there was no peripheral edema [No Extremity Clubbing/Cyanosis] : no extremity clubbing/cyanosis [Soft] : abdomen soft [Non Tender] : non-tender [Non-distended] : non-distended [Normal Posterior Cervical Nodes] : no posterior cervical lymphadenopathy [Normal Anterior Cervical Nodes] : no anterior cervical lymphadenopathy [No CVA Tenderness] : no CVA  tenderness [No Spinal Tenderness] : no spinal tenderness [No Joint Swelling] : no joint swelling [Grossly Normal Strength/Tone] : grossly normal strength/tone [No Rash] : no rash [Coordination Grossly Intact] : coordination grossly intact [No Focal Deficits] : no focal deficits [Normal Gait] : normal gait [Normal Affect] : the affect was normal [Normal Mood] : the mood was normal [de-identified] : 2/4 systolic murmur

## 2022-01-07 DIAGNOSIS — J45.909 UNSPECIFIED ASTHMA, UNCOMPLICATED: ICD-10-CM

## 2022-04-04 ENCOUNTER — APPOINTMENT (OUTPATIENT)
Dept: CARDIOLOGY | Facility: CLINIC | Age: 37
End: 2022-04-04

## 2022-04-24 ENCOUNTER — RX RENEWAL (OUTPATIENT)
Age: 37
End: 2022-04-24

## 2022-05-13 NOTE — DISCHARGE NOTE NURSING/CASE MANAGEMENT/SOCIAL WORK - NURSING SECTION COMPLETE
Patient/Caregiver provided printed discharge information.
street drug/inhalant/medication abuse/caffeine

## 2022-05-23 ENCOUNTER — RX RENEWAL (OUTPATIENT)
Age: 37
End: 2022-05-23

## 2022-06-20 RX ORDER — ALBUTEROL SULFATE 90 UG/1
108 (90 BASE) INHALANT RESPIRATORY (INHALATION)
Qty: 1 | Refills: 1 | Status: ACTIVE | COMMUNITY
Start: 2021-06-22 | End: 1900-01-01

## 2022-06-20 RX ORDER — IPRATROPIUM BROMIDE 0.5 MG/2.5ML
0.02 SOLUTION RESPIRATORY (INHALATION)
Qty: 1 | Refills: 1 | Status: ACTIVE | COMMUNITY
Start: 1900-01-01 | End: 1900-01-01

## 2022-07-15 ENCOUNTER — RX RENEWAL (OUTPATIENT)
Age: 37
End: 2022-07-15

## 2022-08-10 ENCOUNTER — RX RENEWAL (OUTPATIENT)
Age: 37
End: 2022-08-10

## 2022-09-21 ENCOUNTER — RX RENEWAL (OUTPATIENT)
Age: 37
End: 2022-09-21

## 2022-10-05 ENCOUNTER — RX RENEWAL (OUTPATIENT)
Age: 37
End: 2022-10-05

## 2022-10-05 RX ORDER — FLUTICASONE PROPIONATE AND SALMETEROL 100; 50 UG/1; UG/1
100-50 POWDER RESPIRATORY (INHALATION)
Qty: 1 | Refills: 3 | Status: ACTIVE | COMMUNITY
Start: 2022-04-24 | End: 1900-01-01

## 2022-11-20 ENCOUNTER — RX RENEWAL (OUTPATIENT)
Age: 37
End: 2022-11-20

## 2022-12-01 NOTE — ED ADULT TRIAGE NOTE - PRO INTERPRETER NEED 2
English Hemigard Postcare Instructions: The HEMIGARD strips are to remain completely dry for at least 5-7 days.

## 2023-01-30 ENCOUNTER — RX RENEWAL (OUTPATIENT)
Age: 38
End: 2023-01-30

## 2023-02-02 ENCOUNTER — RX RENEWAL (OUTPATIENT)
Age: 38
End: 2023-02-02

## 2023-02-03 ENCOUNTER — RX RENEWAL (OUTPATIENT)
Age: 38
End: 2023-02-03

## 2023-02-03 RX ORDER — ALBUTEROL SULFATE 90 UG/1
108 (90 BASE) AEROSOL, METERED RESPIRATORY (INHALATION)
Qty: 18 | Refills: 0 | Status: ACTIVE | COMMUNITY
Start: 2022-08-10 | End: 1900-01-01

## 2023-02-21 ENCOUNTER — RX RENEWAL (OUTPATIENT)
Age: 38
End: 2023-02-21

## 2023-02-21 RX ORDER — ALBUTEROL SULFATE 2.5 MG/3ML
(2.5 MG/3ML) SOLUTION RESPIRATORY (INHALATION)
Qty: 75 | Refills: 2 | Status: ACTIVE | COMMUNITY
Start: 2020-05-27 | End: 1900-01-01

## 2024-02-16 NOTE — DISCHARGE NOTE PROVIDER - NSDCCPCAREPLAN_GEN_ALL_CORE_FT
Agree with recommendations, thank you.   PRINCIPAL DISCHARGE DIAGNOSIS  Diagnosis: Leukocytosis  Assessment and Plan of Treatment: - CT A/P- showed no acute pathology, no lymphadenopathy noted  - Heme Dr Rivera consulted in the hospital  - obtained PCR for BCR/ABL gene test  - Follow up with Dr Rivera Hemotology as outpatient for further management      SECONDARY DISCHARGE DIAGNOSES  Diagnosis: Musculoskeletal pain  Assessment and Plan of Treatment: - likely strain injury secondary to heavy lifting  - continue taking Naproxen 500 mg oral 2xdaily x 5 days, Protonix 40 oral daily x 7 days, Percocet PRN  5/325 2 tabs as needed for severe pain  - Follow up with PCP as outpatient for further management    Diagnosis: Asthma  Assessment and Plan of Treatment: - continue home inhalers as prescribed